# Patient Record
Sex: FEMALE | Race: WHITE | URBAN - METROPOLITAN AREA
[De-identification: names, ages, dates, MRNs, and addresses within clinical notes are randomized per-mention and may not be internally consistent; named-entity substitution may affect disease eponyms.]

---

## 2020-10-24 ENCOUNTER — OFFICE VISIT (OUTPATIENT)
Dept: SURGERY | Age: 45
End: 2020-10-24
Payer: MEDICARE

## 2020-10-24 VITALS
BODY MASS INDEX: 24.98 KG/M2 | TEMPERATURE: 97.3 F | HEIGHT: 68 IN | SYSTOLIC BLOOD PRESSURE: 146 MMHG | HEART RATE: 52 BPM | DIASTOLIC BLOOD PRESSURE: 83 MMHG | WEIGHT: 164.8 LBS | RESPIRATION RATE: 16 BRPM

## 2020-10-24 PROCEDURE — G8427 DOCREV CUR MEDS BY ELIG CLIN: HCPCS | Performed by: SURGERY

## 2020-10-24 PROCEDURE — G8484 FLU IMMUNIZE NO ADMIN: HCPCS | Performed by: SURGERY

## 2020-10-24 PROCEDURE — 99204 OFFICE O/P NEW MOD 45 MIN: CPT | Performed by: SURGERY

## 2020-10-24 PROCEDURE — G8419 CALC BMI OUT NRM PARAM NOF/U: HCPCS | Performed by: SURGERY

## 2020-10-24 PROCEDURE — 4004F PT TOBACCO SCREEN RCVD TLK: CPT | Performed by: SURGERY

## 2020-10-24 RX ORDER — ALPRAZOLAM 0.5 MG/1
0.5 TABLET ORAL NIGHTLY PRN
COMMUNITY

## 2020-10-24 RX ORDER — PREGABALIN 100 MG/1
100 CAPSULE ORAL 2 TIMES DAILY
COMMUNITY

## 2020-10-24 NOTE — PROGRESS NOTES
[facility]    CC: Symptomatic macromastia    REFERRING PHYSICIAN: [facility]    HPI: This is a 54 y.o.  female who presents to clinic with desire for breast reduction. She underwent spinal decompression/laminectomy at [facility]. She was still having back pain and was advised that her breasts are likely causing her to have persistent back pain from pulling on her spine. Her pertinent breast history include the following:    Last Mammogram:  [facility]    Current bra size: 38DD  Desired bra size: As small as possible \"B cup\"  Pregnancies/miscarriages: 2  0  Breast feeding: no future plans    Breast Symptoms:    Macromastia Symptoms:  Upper back pain: Yes      Bra strap grooves: Yes      Wears supportive bras (>1 yr): Yes      Tried conservative measures (PT, MDs,etc): Yes  (spine surgery)       Intertrigo: Yes      Head/neck pain: Yes      Headaches: Yes      Paresthesias of hands/fingers: Yes      PMHx:   Past Medical History:   Diagnosis Date    Depressed      PSHx:   Past Surgical History:   Procedure Laterality Date    CHOLECYSTECTOMY      TUBAL LIGATION       ALLERGIES:   Allergies   Allergen Reactions    Ceclor [Cefaclor] Rash    Erythromycin Rash    Penicillins Rash    Serotonin Reuptake Inhibitors (Ssris) Rash    Strawberry Extract Rash     SOCIAL: ACTIVELY SMOKING, occ ETOH, occ marijuana  FHx: Past history of breast CA: No   Past family members with breast reduction: No   Past family members with breast augmentation:No    Meds:   No current outpatient medications on file. No current facility-administered medications for this visit. ROS   Constitutional: Negative for chills and fever. HENT: Negative for congestion, facial swelling, and voice change. Eyes: Negative for photophobia and visual disturbance. Respiratory: Negative for apnea, cough, chest tightness and shortness of breath.     Cardiovascular: Negative for chest pain and palpitations. Gastrointestinal: Negative for dysphagia and early satiety. Genitourinary: Negative for difficulty urinating, dysuria, flank pain, frequency and hematuria. Musculoskeletal: See HPI. Skin: Negative for color change, pallor and rash. Endocrine: negative for tremors, temperature intolerance or polydipsia. Allergic/Immunologic: Negative for new environmental or food allergies. Neurological: Negative for dizziness, seizures, speech difficulty, numbness. Hematological: Negative for adenopathy. Psychiatric/Behavioral: Negative for agitation and confusion. EXAM     BP (!) 146/83 (Site: Left Upper Arm, Position: Sitting, Cuff Size: Medium Adult)   Pulse 52   Temp 97.3 °F (36.3 °C) (Temporal)   Resp 16   Ht 5' 8\" (1.727 m)   Wt 164 lb 12.8 oz (74.8 kg)   BMI 25.06 kg/m²     GEN: NAD, pleasant, appears stated age  CVS: RRR  PULM: No respiratory distress  HEENT: PERRLA/EOMI; dentition (wearing mask), hearing appears within normal limits  NECK: Supple with trachea in midline, no masses  EXT: No lymphedema noted  ABD: soft/NT/ND   NEURO: No focal deficits, no obvious CN deficits  BACK: Bilateral latiss muscle intact  BREAST: Right larger than Left   R  Ptosis thgthrthathdtheth:th th4th Palpable masses: No     Nipple retraction: No     Palpable axillary lymphadenopathy: No     SN-N: 29.5 cm     N-IMF: 12.1 cm     Breast width: 14.6 cm         L  Ptosis thgthrthathdtheth:th th4th Palpable masses: No     Nipple retraction: No     Palpable axillary lymphadenopathy: No     SN-N: 29 cm     N-IMF: 11.2 cm     Breast width: 14.3 cm    Estimated Reduction Volume (Schnur scale): 530 grams (each)    RADIOLOGY: None    IMP: 54 y.o. female with symptomatic macromastia  PLAN:Would benefit from reduction mammaplasty. However, prior to any surgical intervention, she needs to completely abstain from nicotine. Will obtain a nicotine level and when negative, will work to schedule. In the interim, will submit to insurance.     A discussion regarding surgical options including: reduction was performed with the patient. Her symptoms of macromastia were discussed in detail and that surgical intervention is focused primarily on relieving upper torso complaints. Clinical photos were obtained. Additionally,discussion regarding the risks including, but not limited to: bleeding (potentially requiring transfusion or reoperation), infection, seroma, reoperation, poor cosmetic outcome, scarring, revisional surgery, nipple loss/complication, nipple malposition, diminished sensation, inability to breastfeed, VTE (DVT/PE), and death was performed. \"A significant amount of time was also allocated to nicotine's effect on wound healing andthe patient understands that a sub-optimal wound healing and cosmetic result may occur with continued utilization. All questions were answered in a satisfactory manner. The patient was counseled at length about the risks of adán Covid-19 during their perioperative period and any recovery window from their procedure. The patient was made aware that adán Covid-19  may worsen their prognosis for recovering from their procedure  and lend to a higher morbidity and/or mortality risk. All material risks, benefits, and reasonable alternatives including postponing the procedure were discussed. The patient does wish to proceed with the procedure at this time.     Sangeetha Blake MD  [facility]  10/24/20

## 2020-10-26 ENCOUNTER — TELEPHONE (OUTPATIENT)
Dept: SURGERY | Age: 45
End: 2020-10-26

## 2020-10-26 NOTE — TELEPHONE ENCOUNTER
The patient was in office to see Dr. Doug Diop 10-. IMP: 54 y.o. female with symptomatic macromastia  PLAN:Would benefit from reduction mammaplasty. However, prior to any surgical intervention, she needs to completely abstain from nicotine. Will obtain a nicotine level and when negative, will work to schedule. In the interim, will submit to insurance. Please send surgery letter.      I will route to MD.

## 2020-10-28 NOTE — TELEPHONE ENCOUNTER
The patient has Medicare [employer] as her primary insurance. The CPT Code does not require prior authorization. The patient has [employer] as her secondary insurance, which follows Medicare guidelines.

## 2020-10-29 NOTE — TELEPHONE ENCOUNTER
I spoke with the patient in regards to surgery mentioned below. She is aware that we do not need approval from her insurance and can move forward with scheduling surgery once she is nicotine free. The patient is aware that she should not use a patch. I will leave this phone note open.

## 2020-12-22 NOTE — TELEPHONE ENCOUNTER
I called the patient to discuss the needed NICOTINE test prior schedule surgery. She said that she is having a hard time with not smoking. The patient is aware that she must be able to have a negative NICOTINE test prior to moving forward with scheduling surgery. The patient will call us when she is prepared to take the NICOTINE test. She will need an additional appointment with Dr. Tammie Duggan prior to scheduling surgery. The appointment can be in office or virtual.    I will close this phone note and remove the surgery letter from my open letters.

## 2023-02-07 ENCOUNTER — PRE-PROCEDURE TELEPHONE (OUTPATIENT)
Dept: INTERVENTIONAL RADIOLOGY/VASCULAR | Age: 48
End: 2023-02-07

## 2023-02-07 RX ORDER — SODIUM CHLORIDE 0.9 % (FLUSH) 0.9 %
5-40 SYRINGE (ML) INJECTION EVERY 12 HOURS SCHEDULED
OUTPATIENT
Start: 2023-02-07

## 2023-02-07 RX ORDER — SODIUM CHLORIDE 9 MG/ML
INJECTION, SOLUTION INTRAVENOUS PRN
OUTPATIENT
Start: 2023-02-07

## 2023-02-07 RX ORDER — SODIUM CHLORIDE 0.9 % (FLUSH) 0.9 %
5-40 SYRINGE (ML) INJECTION PRN
OUTPATIENT
Start: 2023-02-07

## 2023-02-07 NOTE — PRE-PROCEDURE INSTRUCTIONS
Called and spoke to Patient about upcoming procedure. Phone number used: [phone]  Procedure: random renal biopsy  Approving Radiologist: n/a     Pt informed of the following:  Date of procedure: 2/13/2023  Arrival time of procedure: 0700  Time of procedure: 0830     On any blood thinners? No. If yes: N/A  Instructed to hold: N/A     Blood pressure medication? Yes. If yes, take the morning of procedure. Any issues with sedation in the past? No  Will receive versed and fentanyl for conscious sedation and will need a  day of procedure. Post-procedure recovery will be here 3 hours. Nothing to eat or drink after midnight. Need COVID testing prior: No     Need SDS: Yes    Pre-procedure orders placed.   Lab ordered: CBC and PT/INR

## 2023-02-13 ENCOUNTER — HOSPITAL ENCOUNTER (OUTPATIENT)
Dept: CT IMAGING | Age: 48
Discharge: HOME | End: 2023-02-13
Payer: MEDICARE

## 2023-02-13 ENCOUNTER — HOSPITAL ENCOUNTER (OUTPATIENT)
Age: 48
Discharge: HOME | End: 2023-02-13
Payer: MEDICARE

## 2023-02-13 VITALS
OXYGEN SATURATION: 99 % | TEMPERATURE: 97.7 F | BODY MASS INDEX: 28.34 KG/M2 | HEIGHT: 68 IN | WEIGHT: 187 LBS | SYSTOLIC BLOOD PRESSURE: 116 MMHG | DIASTOLIC BLOOD PRESSURE: 79 MMHG | HEART RATE: 71 BPM | RESPIRATION RATE: 16 BRPM

## 2023-02-13 DIAGNOSIS — N18.2 CKD (CHRONIC KIDNEY DISEASE) STAGE 2, GFR 60-89 ML/MIN: ICD-10-CM

## 2023-02-13 DIAGNOSIS — R31.9 HEMATURIA, UNSPECIFIED TYPE: ICD-10-CM

## 2023-02-13 DIAGNOSIS — R80.9 PROTEINURIA, UNSPECIFIED TYPE: ICD-10-CM

## 2023-02-13 LAB
HCT VFR BLD CALC: 45.9 % (ref 36–48)
HEMOGLOBIN: 15.4 G/DL (ref 12–16)
INR BLD: 0.9 (ref 0.87–1.14)
MCH RBC QN AUTO: 29.5 PG (ref 26–34)
MCHC RBC AUTO-ENTMCNC: 33.6 G/DL (ref 31–36)
MCV RBC AUTO: 87.8 FL (ref 80–100)
PDW BLD-RTO: 14.6 % (ref 12.4–15.4)
PLATELET # BLD: 241 K/UL (ref 135–450)
PMV BLD AUTO: 8.3 FL (ref 5–10.5)
PROTHROMBIN TIME: 12 SEC (ref 11.7–14.5)
RBC # BLD: 5.23 M/UL (ref 4–5.2)
WBC # BLD: 9.3 K/UL (ref 4–11)

## 2023-02-13 PROCEDURE — 50200 RENAL BIOPSY PERQ: CPT

## 2023-02-13 PROCEDURE — 85610 PROTHROMBIN TIME: CPT

## 2023-02-13 PROCEDURE — 6360000002 HC RX W HCPCS: Performed by: RADIOLOGY

## 2023-02-13 PROCEDURE — 77012 CT SCAN FOR NEEDLE BIOPSY: CPT

## 2023-02-13 PROCEDURE — 36415 COLL VENOUS BLD VENIPUNCTURE: CPT

## 2023-02-13 PROCEDURE — 85027 COMPLETE CBC AUTOMATED: CPT

## 2023-02-13 RX ORDER — AMLODIPINE BESYLATE 5 MG/1
5 TABLET ORAL DAILY
COMMUNITY

## 2023-02-13 RX ORDER — LISINOPRIL 5 MG/1
5 TABLET ORAL 2 TIMES DAILY
COMMUNITY

## 2023-02-13 RX ORDER — MIDAZOLAM HYDROCHLORIDE 1 MG/ML
INJECTION INTRAMUSCULAR; INTRAVENOUS
Status: COMPLETED | OUTPATIENT
Start: 2023-02-13 | End: 2023-02-13

## 2023-02-13 RX ORDER — FENTANYL CITRATE 50 UG/ML
INJECTION, SOLUTION INTRAMUSCULAR; INTRAVENOUS
Status: COMPLETED | OUTPATIENT
Start: 2023-02-13 | End: 2023-02-13

## 2023-02-13 RX ORDER — ACETAMINOPHEN 325 MG/1
650 TABLET ORAL EVERY 4 HOURS PRN
Status: DISCONTINUED | OUTPATIENT
Start: 2023-02-13 | End: 2023-02-14 | Stop reason: HOSPADM

## 2023-02-13 RX ADMIN — FENTANYL CITRATE 50 MCG: 50 INJECTION INTRAMUSCULAR; INTRAVENOUS at 09:08

## 2023-02-13 RX ADMIN — FENTANYL CITRATE 50 MCG: 50 INJECTION INTRAMUSCULAR; INTRAVENOUS at 08:55

## 2023-02-13 RX ADMIN — MIDAZOLAM 2 MG: 1 INJECTION INTRAMUSCULAR; INTRAVENOUS at 08:55

## 2023-02-13 ASSESSMENT — PAIN SCALES - GENERAL
PAINLEVEL_OUTOF10: 0

## 2023-02-13 ASSESSMENT — PAIN - FUNCTIONAL ASSESSMENT: PAIN_FUNCTIONAL_ASSESSMENT: 0-10

## 2023-02-13 NOTE — PROGRESS NOTES
Discharge order received, patient discharged in stable condition with family present at time of discharge. Discharge instructions given, patient and family verbalized understanding, no needs. IV removed intact. Biopsy site WNL, VSS on RA, patient denies pain. Ambulatory to car with all personal belongings.   Akhil Hsu RN

## 2023-02-13 NOTE — PROGRESS NOTES
Pt arrived for image guided random renal biopsy. Procedure explained including the risk and benefits of the procedure. All questions answered. Pt verbalizes understanding of the of procedure and states no more questions. Consent signed. Vital signs stable, labs, allergies, medications, and code status reviewed. No contraindications noted.      Vitals:    02/13/23 0844   BP: (!) 142/66   Pulse: 75   Resp: 15   Temp:    SpO2:     (vital signs in table format)    Joseline Score  2 - Able to move 4 extremities voluntarily on command  2 - BP+/- 20mmHg of normal  2 - Fully awake  2 - Able to maintain oxygen saturation >92% on room air  2 - Able to breathe deeply and cough freely    Allergies  Naproxen, Trazodone, Adhesive tape, Ceclor [cefaclor], Clindamycin, Erythromycin, Iodine, Penicillins, Serotonin reuptake inhibitors (ssris), and [place] extract    Labs  No results found for: INR, PROTIME    Lab Results   Component Value Date    CREATININE 0.8 05/13/2015    BUN 5 (L) 05/13/2015     05/13/2015    K 3.6 05/13/2015     05/13/2015    CO2 25 05/13/2015       Lab Results   Component Value Date    WBC 9.3 02/13/2023    HGB 15.4 02/13/2023    HCT 45.9 02/13/2023    MCV 87.8 02/13/2023     02/13/2023

## 2023-02-13 NOTE — PROGRESS NOTES
Patient sitting up in bed awake, alert and oriented, VSS on RA. Biopsy site WNL. Patient ambulated to bathroom without difficulty and tolerated lunch tray. Family remains at beside, call light within reach. Monitoring per protocol.    Tori Parks RN

## 2023-02-13 NOTE — PROGRESS NOTES
Biopsy complete, patient tolerated well. VSS on RA, biopsy site WNL. Patient lying supine with pressure dressing on biopsy site per MD order. Specimens sent to lab. Patient's mother at bedside, call light within reach, monitoring per protocol.   Emily Laws RN

## 2023-02-17 ENCOUNTER — HOSPITAL ENCOUNTER (OUTPATIENT)
Age: 48
Discharge: HOME | End: 2023-02-17
Payer: MEDICARE

## 2023-02-17 LAB
KAPPA/LAMBDA TEST COMMENT: NORMAL
TOTAL PROTEIN: 6.7 G/DL (ref 6.4–8.2)

## 2023-02-17 PROCEDURE — 84165 PROTEIN E-PHORESIS SERUM: CPT

## 2023-02-17 PROCEDURE — 83883 ASSAY NEPHELOMETRY NOT SPEC: CPT

## 2023-02-17 PROCEDURE — 84155 ASSAY OF PROTEIN SERUM: CPT

## 2023-02-17 PROCEDURE — 86334 IMMUNOFIX E-PHORESIS SERUM: CPT

## 2024-09-26 ENCOUNTER — TELEPHONE (OUTPATIENT)
Dept: SURGERY | Age: 49
End: 2024-09-26